# Patient Record
Sex: FEMALE | Race: WHITE | Employment: STUDENT | URBAN - METROPOLITAN AREA
[De-identification: names, ages, dates, MRNs, and addresses within clinical notes are randomized per-mention and may not be internally consistent; named-entity substitution may affect disease eponyms.]

---
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
## 2023-03-15 DIAGNOSIS — F32.A ANXIETY AND DEPRESSION: ICD-10-CM

## 2023-03-15 DIAGNOSIS — F41.9 ANXIETY AND DEPRESSION: ICD-10-CM

## 2023-03-19 RX ORDER — SERTRALINE HYDROCHLORIDE 100 MG/1
150 TABLET, FILM COATED ORAL EVERY MORNING
Qty: 20 TABLET | Refills: 0 | Status: SHIPPED | OUTPATIENT
Start: 2023-03-19

## 2023-03-21 ENCOUNTER — PATIENT MESSAGE (OUTPATIENT)
Dept: FAMILY MEDICINE CLINIC | Facility: CLINIC | Age: 18
End: 2023-03-21

## 2023-03-22 NOTE — TELEPHONE ENCOUNTER
Last tele med: 11/14/23  Last OV: 7/26/22    Printed and put in office for review (also requesting a signed letter)  LE ok for requests?

## 2023-03-22 NOTE — TELEPHONE ENCOUNTER
From: [facility]  To: Glenn Epley, DO  Sent: 3/21/2023 4:24 PM CDT  Subject: Chata Coleman,    I was wondering if you would be able to fill out some paperwork for a housing request for my living situation at school next year. Because of my blood test coming back for celiac, I have been gluten free for a year. My school does not have very many gluten free options and there are some on campus apartments that have a kitchen. This form would ensure that I have gluten free options for next year. Thank you!   [facility]

## 2023-05-12 ENCOUNTER — OFFICE VISIT (OUTPATIENT)
Dept: FAMILY MEDICINE CLINIC | Facility: CLINIC | Age: 18
End: 2023-05-12
Payer: COMMERCIAL

## 2023-05-12 VITALS
DIASTOLIC BLOOD PRESSURE: 68 MMHG | HEIGHT: 64 IN | WEIGHT: 143 LBS | SYSTOLIC BLOOD PRESSURE: 98 MMHG | HEART RATE: 98 BPM | OXYGEN SATURATION: 98 % | BODY MASS INDEX: 24.41 KG/M2 | RESPIRATION RATE: 16 BRPM

## 2023-05-12 DIAGNOSIS — F41.9 ANXIETY AND DEPRESSION: ICD-10-CM

## 2023-05-12 DIAGNOSIS — H66.90 EAR INFECTION: Primary | ICD-10-CM

## 2023-05-12 DIAGNOSIS — F90.9 ATTENTION DEFICIT HYPERACTIVITY DISORDER (ADHD), UNSPECIFIED ADHD TYPE: ICD-10-CM

## 2023-05-12 DIAGNOSIS — R09.81 NASAL CONGESTION: ICD-10-CM

## 2023-05-12 DIAGNOSIS — F32.A ANXIETY AND DEPRESSION: ICD-10-CM

## 2023-05-12 DIAGNOSIS — J02.9 SORE THROAT: ICD-10-CM

## 2023-05-12 LAB
CONTROL LINE PRESENT WITH A CLEAR BACKGROUND (YES/NO): YES YES/NO
CONTROL LINE PRESENT WITH A CLEAR BACKGROUND (YES/NO): YES YES/NO
KIT LOT #: 5064 NUMERIC
MONONUCLEOSIS TEST, QUAL: NEGATIVE
STREP GRP A CUL-SCR: NEGATIVE

## 2023-05-12 PROCEDURE — 3074F SYST BP LT 130 MM HG: CPT | Performed by: FAMILY MEDICINE

## 2023-05-12 PROCEDURE — 87081 CULTURE SCREEN ONLY: CPT | Performed by: FAMILY MEDICINE

## 2023-05-12 PROCEDURE — 3078F DIAST BP <80 MM HG: CPT | Performed by: FAMILY MEDICINE

## 2023-05-12 PROCEDURE — 3008F BODY MASS INDEX DOCD: CPT | Performed by: FAMILY MEDICINE

## 2023-05-12 PROCEDURE — 86308 HETEROPHILE ANTIBODY SCREEN: CPT | Performed by: FAMILY MEDICINE

## 2023-05-12 PROCEDURE — 99214 OFFICE O/P EST MOD 30 MIN: CPT | Performed by: FAMILY MEDICINE

## 2023-05-12 PROCEDURE — 87880 STREP A ASSAY W/OPTIC: CPT | Performed by: FAMILY MEDICINE

## 2023-05-12 RX ORDER — SERTRALINE HYDROCHLORIDE 100 MG/1
150 TABLET, FILM COATED ORAL EVERY MORNING
Qty: 135 TABLET | Refills: 0 | Status: SHIPPED | OUTPATIENT
Start: 2023-05-12

## 2023-05-12 RX ORDER — AZELASTINE HYDROCHLORIDE 137 UG/1
2 SPRAY, METERED NASAL NIGHTLY
Qty: 3 EACH | Refills: 0 | Status: SHIPPED | OUTPATIENT
Start: 2023-05-12

## 2023-05-12 RX ORDER — NORGESTIMATE AND ETHINYL ESTRADIOL 7DAYSX3 28
1 KIT ORAL DAILY
Qty: 84 TABLET | Refills: 0 | Status: SHIPPED | OUTPATIENT
Start: 2023-05-12

## 2023-05-12 RX ORDER — TRIAMCINOLONE ACETONIDE 55 UG/1
2 SPRAY, METERED NASAL NIGHTLY
Qty: 3 EACH | Refills: 0 | Status: SHIPPED | OUTPATIENT
Start: 2023-05-12

## 2023-05-12 RX ORDER — METHYLPHENIDATE HYDROCHLORIDE 18 MG/1
18 TABLET ORAL DAILY
Qty: 30 TABLET | Refills: 0 | Status: SHIPPED | OUTPATIENT
Start: 2023-07-13 | End: 2023-08-12

## 2023-05-12 RX ORDER — METHYLPHENIDATE HYDROCHLORIDE 18 MG/1
18 TABLET ORAL DAILY
Qty: 30 TABLET | Refills: 0 | Status: SHIPPED | OUTPATIENT
Start: 2023-06-12 | End: 2023-07-12

## 2023-05-12 RX ORDER — AMOXICILLIN AND CLAVULANATE POTASSIUM 875; 125 MG/1; MG/1
1 TABLET, FILM COATED ORAL 2 TIMES DAILY
Qty: 20 TABLET | Refills: 0 | Status: SHIPPED | OUTPATIENT
Start: 2023-05-12 | End: 2023-05-22

## 2023-05-12 RX ORDER — METHYLPHENIDATE HYDROCHLORIDE 18 MG/1
18 TABLET ORAL DAILY
Qty: 30 TABLET | Refills: 0 | Status: SHIPPED | OUTPATIENT
Start: 2023-05-12 | End: 2023-06-11

## 2023-08-06 ENCOUNTER — PATIENT MESSAGE (OUTPATIENT)
Dept: FAMILY MEDICINE CLINIC | Facility: CLINIC | Age: 18
End: 2023-08-06

## 2023-08-06 DIAGNOSIS — Z30.09 BIRTH CONTROL COUNSELING: Primary | ICD-10-CM

## 2023-08-07 DIAGNOSIS — F41.9 ANXIETY AND DEPRESSION: ICD-10-CM

## 2023-08-07 DIAGNOSIS — F32.A ANXIETY AND DEPRESSION: ICD-10-CM

## 2023-08-07 NOTE — TELEPHONE ENCOUNTER
From: Marlena Giang  To: Ene Gresham DO  Sent: 8/6/2023 6:51 PM CDT  Subject: Birth Control     Hello,    I hope you are having a great day. I was thinking about making the switch from the pill to a nexplanon implant. What should my next steps be? Thank you!

## 2023-08-08 RX ORDER — SERTRALINE HYDROCHLORIDE 100 MG/1
150 TABLET, FILM COATED ORAL EVERY MORNING
Qty: 45 TABLET | Refills: 0 | Status: SHIPPED | OUTPATIENT
Start: 2023-08-08

## 2023-10-30 DIAGNOSIS — F32.A ANXIETY AND DEPRESSION: ICD-10-CM

## 2023-10-30 DIAGNOSIS — F41.9 ANXIETY AND DEPRESSION: ICD-10-CM

## 2023-10-30 NOTE — TELEPHONE ENCOUNTER
.A refill request was received for:  Requested Prescriptions     Pending Prescriptions Disp Refills    SERTRALINE 100 MG Oral Tab [Pharmacy Med Name: SERTRALINE 100MG TABLETS] 45 tablet 0     Sig: TAKE 1 AND 1/2 TABLETS(150 MG) BY MOUTH EVERY MORNING       Last refill date:   8/8/2023    Last office visit: 5/12/2023    Follow up due:  No future appointments.

## 2023-11-01 RX ORDER — SERTRALINE HYDROCHLORIDE 100 MG/1
150 TABLET, FILM COATED ORAL EVERY MORNING
Qty: 15 TABLET | Refills: 0 | Status: SHIPPED | OUTPATIENT
Start: 2023-11-01

## 2023-11-17 DIAGNOSIS — R09.81 NASAL CONGESTION: ICD-10-CM

## 2023-11-17 RX ORDER — AZELASTINE HYDROCHLORIDE 137 UG/1
2 SPRAY, METERED NASAL NIGHTLY
Qty: 3 EACH | Refills: 0 | Status: SHIPPED | OUTPATIENT
Start: 2023-11-17

## 2023-12-01 ENCOUNTER — PATIENT MESSAGE (OUTPATIENT)
Dept: FAMILY MEDICINE CLINIC | Facility: CLINIC | Age: 18
End: 2023-12-01

## 2023-12-01 NOTE — TELEPHONE ENCOUNTER
From: Marlena Giang  To: Ene Gresham  Sent: 12/1/2023 12:21 PM CST  Subject: Obgyn referral    Hello! Also was wondering if you had a referral for an OB/GYN?      Thank you,  Marlena Giang
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
## 2024-01-08 ENCOUNTER — TELEMEDICINE (OUTPATIENT)
Dept: FAMILY MEDICINE CLINIC | Facility: CLINIC | Age: 19
End: 2024-01-08
Payer: COMMERCIAL

## 2024-01-08 DIAGNOSIS — F41.9 ANXIETY AND DEPRESSION: ICD-10-CM

## 2024-01-08 DIAGNOSIS — F90.9 ATTENTION DEFICIT HYPERACTIVITY DISORDER (ADHD), UNSPECIFIED ADHD TYPE: ICD-10-CM

## 2024-01-08 DIAGNOSIS — F32.A ANXIETY AND DEPRESSION: ICD-10-CM

## 2024-01-08 DIAGNOSIS — F43.10 PTSD (POST-TRAUMATIC STRESS DISORDER): ICD-10-CM

## 2024-01-08 RX ORDER — SERTRALINE HYDROCHLORIDE 100 MG/1
200 TABLET, FILM COATED ORAL EVERY MORNING
Qty: 60 TABLET | Refills: 0 | Status: SHIPPED | OUTPATIENT
Start: 2024-01-08

## 2024-01-08 RX ORDER — METHYLPHENIDATE HYDROCHLORIDE 18 MG/1
18 TABLET ORAL EVERY MORNING
Qty: 30 TABLET | Refills: 0 | Status: SHIPPED | OUTPATIENT
Start: 2024-01-08

## 2024-02-05 ENCOUNTER — PATIENT MESSAGE (OUTPATIENT)
Dept: FAMILY MEDICINE CLINIC | Facility: CLINIC | Age: 19
End: 2024-02-05

## 2024-02-05 DIAGNOSIS — F32.A ANXIETY AND DEPRESSION: ICD-10-CM

## 2024-02-05 DIAGNOSIS — F41.9 ANXIETY AND DEPRESSION: ICD-10-CM

## 2024-02-05 DIAGNOSIS — F43.10 PTSD (POST-TRAUMATIC STRESS DISORDER): ICD-10-CM

## 2024-02-05 DIAGNOSIS — F90.9 ATTENTION DEFICIT HYPERACTIVITY DISORDER (ADHD), UNSPECIFIED ADHD TYPE: ICD-10-CM

## 2024-02-05 RX ORDER — METHYLPHENIDATE HYDROCHLORIDE 18 MG/1
18 TABLET ORAL EVERY MORNING
Qty: 30 TABLET | Refills: 0 | Status: SHIPPED | OUTPATIENT
Start: 2024-02-05

## 2024-02-05 RX ORDER — SERTRALINE HYDROCHLORIDE 100 MG/1
200 TABLET, FILM COATED ORAL EVERY MORNING
Qty: 60 TABLET | Refills: 0 | Status: SHIPPED | OUTPATIENT
Start: 2024-02-05

## 2024-02-05 NOTE — TELEPHONE ENCOUNTER
From: Libra Brar  To: Opal Turner  Sent: 2/5/2024 2:26 PM CST  Subject: Sending prescriptions    Hello!    I was wondering if you would be able to send my sertraline and concerta prescriptions to the [facility] in [place]? Let me know if there’s anything on my end that needs to be completed.    Thank you,  Libra Brar

## 2024-03-07 DIAGNOSIS — F41.9 ANXIETY AND DEPRESSION: ICD-10-CM

## 2024-03-07 DIAGNOSIS — F32.A ANXIETY AND DEPRESSION: ICD-10-CM

## 2024-03-07 DIAGNOSIS — F43.10 PTSD (POST-TRAUMATIC STRESS DISORDER): ICD-10-CM

## 2024-03-07 RX ORDER — SERTRALINE HYDROCHLORIDE 100 MG/1
200 TABLET, FILM COATED ORAL EVERY MORNING
Qty: 60 TABLET | Refills: 0 | Status: SHIPPED | OUTPATIENT
Start: 2024-03-07

## 2024-03-07 NOTE — TELEPHONE ENCOUNTER
A refill request was received for:  Requested Prescriptions     Pending Prescriptions Disp Refills    SERTRALINE 100 MG Oral Tab [Pharmacy Med Name: SERTRALINE  MG TABLET] 60 tablet 0     Sig: TAKE 2 TABLETS BY MOUTH EVERY MORNING.       Last refill date:   2-5-24    Last office visit: 1-8-24    Follow up due:  No future appointments.

## 2024-03-14 DIAGNOSIS — F43.10 PTSD (POST-TRAUMATIC STRESS DISORDER): ICD-10-CM

## 2024-03-14 DIAGNOSIS — F32.A ANXIETY AND DEPRESSION: ICD-10-CM

## 2024-03-14 DIAGNOSIS — F41.9 ANXIETY AND DEPRESSION: ICD-10-CM

## 2024-03-14 RX ORDER — SERTRALINE HYDROCHLORIDE 100 MG/1
200 TABLET, FILM COATED ORAL EVERY MORNING
Qty: 60 TABLET | Refills: 0 | OUTPATIENT
Start: 2024-03-14

## 2024-03-14 NOTE — TELEPHONE ENCOUNTER
.A refill request was received for:  Requested Prescriptions     Pending Prescriptions Disp Refills    SERTRALINE 100 MG Oral Tab [Pharmacy Med Name: SERTRALINE 100MG TABLETS] 60 tablet 0     Sig: TAKE 2 TABLETS(200 MG) BY MOUTH EVERY MORNING       Last refill date:       Last office visit: 1/8/2024    Follow up due:  No future appointments.

## 2024-03-15 RX ORDER — SERTRALINE HYDROCHLORIDE 100 MG/1
200 TABLET, FILM COATED ORAL EVERY MORNING
Qty: 60 TABLET | Refills: 0 | Status: SHIPPED | OUTPATIENT
Start: 2024-03-15

## 2024-03-15 NOTE — TELEPHONE ENCOUNTER
A refill request was received for:  Requested Prescriptions     Pending Prescriptions Disp Refills    sertraline 100 MG Oral Tab 60 tablet 0     Sig: Take 2 tablets (200 mg total) by mouth every morning.       Last refill date:3/7/2024       Last office visit:1/8/2024     Follow up due:  No future appointments.

## 2024-04-14 DIAGNOSIS — F41.9 ANXIETY AND DEPRESSION: ICD-10-CM

## 2024-04-14 DIAGNOSIS — F32.A ANXIETY AND DEPRESSION: ICD-10-CM

## 2024-04-14 DIAGNOSIS — F43.10 PTSD (POST-TRAUMATIC STRESS DISORDER): ICD-10-CM

## 2024-04-15 RX ORDER — SERTRALINE HYDROCHLORIDE 100 MG/1
200 TABLET, FILM COATED ORAL EVERY MORNING
Qty: 60 TABLET | Refills: 0 | Status: SHIPPED | OUTPATIENT
Start: 2024-04-15

## 2024-04-15 NOTE — TELEPHONE ENCOUNTER
.A refill request was received for:  Requested Prescriptions     Pending Prescriptions Disp Refills    SERTRALINE 100 MG Oral Tab [Pharmacy Med Name: SERTRALINE  MG TABLET] 60 tablet 0     Sig: TAKE 2 TABLETS BY MOUTH EVERY MORNING.       Last refill date:   3/15/2023    Last office visit: 12/5/2023    Follow up due:  No future appointments.  . She shp    . Please call Pt to schedule a f/u. She should be seen within the next moth

## 2024-05-13 ENCOUNTER — OFFICE VISIT (OUTPATIENT)
Dept: FAMILY MEDICINE CLINIC | Facility: CLINIC | Age: 19
End: 2024-05-13

## 2024-05-13 VITALS
HEART RATE: 87 BPM | HEIGHT: 64 IN | SYSTOLIC BLOOD PRESSURE: 112 MMHG | RESPIRATION RATE: 18 BRPM | BODY MASS INDEX: 24 KG/M2 | DIASTOLIC BLOOD PRESSURE: 62 MMHG | OXYGEN SATURATION: 96 %

## 2024-05-13 DIAGNOSIS — B96.89 ACUTE BACTERIAL SINUSITIS: ICD-10-CM

## 2024-05-13 DIAGNOSIS — F43.10 PTSD (POST-TRAUMATIC STRESS DISORDER): ICD-10-CM

## 2024-05-13 DIAGNOSIS — Z00.00 ROUTINE GENERAL MEDICAL EXAMINATION AT A HEALTH CARE FACILITY: Primary | ICD-10-CM

## 2024-05-13 DIAGNOSIS — Z11.3 SCREENING EXAMINATION FOR STD (SEXUALLY TRANSMITTED DISEASE): ICD-10-CM

## 2024-05-13 DIAGNOSIS — J01.90 ACUTE BACTERIAL SINUSITIS: ICD-10-CM

## 2024-05-13 DIAGNOSIS — L70.8 OTHER ACNE: ICD-10-CM

## 2024-05-13 DIAGNOSIS — J31.0 NONALLERGIC RHINITIS: ICD-10-CM

## 2024-05-13 PROCEDURE — 87591 N.GONORRHOEAE DNA AMP PROB: CPT | Performed by: INTERNAL MEDICINE

## 2024-05-13 PROCEDURE — 99213 OFFICE O/P EST LOW 20 MIN: CPT | Performed by: INTERNAL MEDICINE

## 2024-05-13 PROCEDURE — 87491 CHLMYD TRACH DNA AMP PROBE: CPT | Performed by: INTERNAL MEDICINE

## 2024-05-13 PROCEDURE — 99395 PREV VISIT EST AGE 18-39: CPT | Performed by: INTERNAL MEDICINE

## 2024-05-13 RX ORDER — SULFAMETHOXAZOLE AND TRIMETHOPRIM 800; 160 MG/1; MG/1
1 TABLET ORAL 2 TIMES DAILY
Qty: 20 TABLET | Refills: 0 | Status: SHIPPED | OUTPATIENT
Start: 2024-05-13 | End: 2024-05-23

## 2024-05-13 RX ORDER — SERTRALINE HYDROCHLORIDE 100 MG/1
TABLET, FILM COATED ORAL
Qty: 180 TABLET | Refills: 1 | Status: SHIPPED | OUTPATIENT
Start: 2024-05-13

## 2024-05-13 RX ORDER — FEXOFENADINE HCL AND PSEUDOEPHEDRINE HCI 60; 120 MG/1; MG/1
1 TABLET, EXTENDED RELEASE ORAL DAILY PRN
Qty: 30 TABLET | Refills: 1 | Status: SHIPPED | OUTPATIENT
Start: 2024-05-13
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
## 2024-05-14 LAB
C TRACH DNA SPEC QL NAA+PROBE: NEGATIVE
N GONORRHOEA DNA SPEC QL NAA+PROBE: NEGATIVE

## 2024-08-26 DIAGNOSIS — J31.0 NONALLERGIC RHINITIS: ICD-10-CM

## 2024-08-26 DIAGNOSIS — F90.9 ATTENTION DEFICIT HYPERACTIVITY DISORDER (ADHD), UNSPECIFIED ADHD TYPE: ICD-10-CM

## 2024-08-27 RX ORDER — SERTRALINE HYDROCHLORIDE 100 MG/1
TABLET, FILM COATED ORAL
Qty: 180 TABLET | Refills: 1 | Status: SHIPPED | OUTPATIENT
Start: 2024-08-27

## 2024-08-27 RX ORDER — METHYLPHENIDATE HYDROCHLORIDE 18 MG/1
18 TABLET ORAL EVERY MORNING
Qty: 30 TABLET | Refills: 0 | OUTPATIENT
Start: 2024-08-27

## 2024-08-27 RX ORDER — FEXOFENADINE HCL AND PSEUDOEPHEDRINE HCI 60; 120 MG/1; MG/1
1 TABLET, EXTENDED RELEASE ORAL DAILY PRN
Qty: 30 TABLET | Refills: 1 | Status: SHIPPED | OUTPATIENT
Start: 2024-08-27

## 2024-08-27 NOTE — TELEPHONE ENCOUNTER
A refill request was received for:  Requested Prescriptions     Pending Prescriptions Disp Refills    sertraline 100 MG Oral Tab 180 tablet 1     Si tabs po daily    fexofenadine-pseudoephedrine ER (ALLEGRA-D ALLERGY & CONGESTION)  MG Oral Tablet 12 Hr 30 tablet 1     Sig: Take 1 tablet by mouth daily as needed.       Last refill date:2024       Last office visit:2024     Follow up due:  No future appointments.

## 2024-08-27 NOTE — TELEPHONE ENCOUNTER
A refill request was received for:  Requested Prescriptions     Pending Prescriptions Disp Refills    methylphenidate ER (CONCERTA) 18 MG Oral Tab CR 30 tablet 0     Sig: Take 1 tablet (18 mg total) by mouth every morning.       Last refill date:2/5/2024      Last office visit:5/13/2024     Follow up due:  No future appointments.

## 2024-09-26 DIAGNOSIS — F90.9 ATTENTION DEFICIT HYPERACTIVITY DISORDER (ADHD), UNSPECIFIED ADHD TYPE: ICD-10-CM

## 2024-09-27 RX ORDER — METHYLPHENIDATE HYDROCHLORIDE 18 MG/1
18 TABLET ORAL EVERY MORNING
Qty: 30 TABLET | Refills: 0 | OUTPATIENT
Start: 2024-09-27

## 2024-09-27 NOTE — TELEPHONE ENCOUNTER
.A refill request was received for:  Requested Prescriptions     Pending Prescriptions Disp Refills    methylphenidate ER (CONCERTA) 18 MG Oral Tab CR 30 tablet 0     Sig: Take 1 tablet (18 mg total) by mouth every morning.       Last refill date:   2/5/2024    Last office visit: 5/13/24    Follow up due:  No future appointments.

## 2024-10-02 DIAGNOSIS — F90.9 ATTENTION DEFICIT HYPERACTIVITY DISORDER (ADHD), UNSPECIFIED ADHD TYPE: ICD-10-CM

## 2024-10-03 RX ORDER — METHYLPHENIDATE HYDROCHLORIDE 18 MG/1
18 TABLET ORAL EVERY MORNING
Qty: 30 TABLET | Refills: 0 | OUTPATIENT
Start: 2024-10-03

## 2024-10-24 PROBLEM — J30.2 SEASONAL ALLERGIES: Status: ACTIVE | Noted: 2024-10-24

## 2024-10-24 PROBLEM — L70.0 ACNE VULGARIS: Status: ACTIVE | Noted: 2024-10-24

## 2024-10-24 PROBLEM — F41.9 ANXIETY: Status: ACTIVE | Noted: 2024-10-24

## 2025-01-15 ENCOUNTER — PATIENT MESSAGE (OUTPATIENT)
Dept: FAMILY MEDICINE CLINIC | Facility: CLINIC | Age: 20
End: 2025-01-15

## 2025-01-15 DIAGNOSIS — F41.9 ANXIETY: ICD-10-CM

## 2025-01-15 DIAGNOSIS — F90.0 ATTENTION DEFICIT HYPERACTIVITY DISORDER (ADHD), PREDOMINANTLY INATTENTIVE TYPE: ICD-10-CM

## 2025-01-16 RX ORDER — ARIPIPRAZOLE 2 MG/1
2 TABLET ORAL DAILY
Qty: 30 TABLET | Refills: 3 | Status: SHIPPED | OUTPATIENT
Start: 2025-01-16

## 2025-01-16 RX ORDER — METHYLPHENIDATE HYDROCHLORIDE 18 MG/1
18 TABLET ORAL DAILY
Qty: 30 TABLET | Refills: 0 | Status: SHIPPED | OUTPATIENT
Start: 2025-01-16 | End: 2025-02-15

## 2025-01-21 DIAGNOSIS — F32.A ANXIETY AND DEPRESSION: ICD-10-CM

## 2025-01-21 DIAGNOSIS — F41.9 ANXIETY AND DEPRESSION: ICD-10-CM

## 2025-01-21 DIAGNOSIS — J31.0 NONALLERGIC RHINITIS: ICD-10-CM

## 2025-01-21 RX ORDER — SERTRALINE HYDROCHLORIDE 100 MG/1
200 TABLET, FILM COATED ORAL EVERY MORNING
Qty: 180 TABLET | Refills: 0 | Status: SHIPPED | OUTPATIENT
Start: 2025-01-21 | End: 2025-01-23

## 2025-01-21 RX ORDER — SEGESTERONE ACETATE AND ETHINYL ESTRADIOL 103; 17.4 MG/1; MG/1
1 RING VAGINAL
Qty: 1 EACH | Refills: 0 | OUTPATIENT
Start: 2025-01-21

## 2025-01-21 RX ORDER — FEXOFENADINE HCL AND PSEUDOEPHEDRINE HCI 60; 120 MG/1; MG/1
1 TABLET, EXTENDED RELEASE ORAL DAILY PRN
Qty: 30 TABLET | Refills: 1 | Status: SHIPPED | OUTPATIENT
Start: 2025-01-21 | End: 2025-01-23

## 2025-01-21 NOTE — TELEPHONE ENCOUNTER
A refill request was received for:  Requested Prescriptions     Pending Prescriptions Disp Refills    fexofenadine-pseudoephedrine ER (ALLEGRA-D ALLERGY & CONGESTION)  MG Oral Tablet 12 Hr 30 tablet 1     Sig: Take 1 tablet by mouth daily as needed.    sertraline 100 MG Oral Tab 180 tablet 0     Sig: Take 2 tablets (200 mg total) by mouth every morning.       Last refill date: Sertraline 10/24/2024  Fexofenadine 8/27/2024      Last office visit: 11/7/2024 telemed    Follow up due:  No future appointments.

## 2025-01-21 NOTE — TELEPHONE ENCOUNTER
A refill request was received for:  Requested Prescriptions     Pending Prescriptions Disp Refills    ANNOVERA 0.013-0.15 MG/24HR Vaginal Ring 1 each 0     Sig: Place 1 Ring vaginally every 30 (thirty) days.       Last refill date:   6/18/24    Last office visit: 11/7/24    Follow up due:  No future appointments.

## 2025-02-18 DIAGNOSIS — F90.9 ATTENTION DEFICIT HYPERACTIVITY DISORDER (ADHD), UNSPECIFIED ADHD TYPE: ICD-10-CM

## 2025-02-18 DIAGNOSIS — F41.9 ANXIETY: ICD-10-CM

## 2025-02-18 NOTE — TELEPHONE ENCOUNTER
A refill request was received for:  Requested Prescriptions     Pending Prescriptions Disp Refills    ARIPiprazole (ABILIFY) 2 MG Oral Tab 30 tablet 3     Sig: Take 1 tablet (2 mg total) by mouth daily.       Last refill date:   1-16-25    Last office visit: 11-7-24 Tele visit    Follow up due:  Future Appointments   Date Time Provider Department Center   3/11/2025  3:30 PM Morenita Lynn NP [facility]   3/20/2025 10:30 AM Morenita Lynn NP [facility]

## 2025-02-18 NOTE — TELEPHONE ENCOUNTER
A refill request was received for:  Requested Prescriptions     Pending Prescriptions Disp Refills    methylphenidate ER (CONCERTA) 18 MG Oral Tab CR 30 tablet 0     Sig: Take 1 tablet (18 mg total) by mouth every morning.       Last refill date:1/16/2025       Last office visit: 10/24/2024 telemed    Follow up due:  Future Appointments   Date Time Provider Department Center   3/11/2025  3:30 PM Morenita Lynn NP [facility]   3/20/2025 10:30 AM Morenita Lynn NP [facility]

## 2025-02-20 RX ORDER — ARIPIPRAZOLE 2 MG/1
2 TABLET ORAL DAILY
Qty: 30 TABLET | Refills: 0 | Status: SHIPPED | OUTPATIENT
Start: 2025-02-20

## 2025-02-20 RX ORDER — METHYLPHENIDATE HYDROCHLORIDE 18 MG/1
18 TABLET ORAL EVERY MORNING
Qty: 30 TABLET | Refills: 0 | Status: SHIPPED | OUTPATIENT
Start: 2025-02-20

## 2025-03-20 ENCOUNTER — HOSPITAL ENCOUNTER (OUTPATIENT)
Dept: GENERAL RADIOLOGY | Age: 20
Discharge: HOME | End: 2025-03-20
Attending: INTERNAL MEDICINE
Payer: COMMERCIAL

## 2025-03-20 ENCOUNTER — EKG ENCOUNTER (OUTPATIENT)
Dept: LAB | Age: 20
End: 2025-03-20
Attending: INTERNAL MEDICINE
Payer: COMMERCIAL

## 2025-03-20 ENCOUNTER — OFFICE VISIT (OUTPATIENT)
Dept: FAMILY MEDICINE CLINIC | Facility: CLINIC | Age: 20
End: 2025-03-20
Payer: COMMERCIAL

## 2025-03-20 VITALS
HEIGHT: 64 IN | RESPIRATION RATE: 16 BRPM | HEART RATE: 96 BPM | OXYGEN SATURATION: 98 % | SYSTOLIC BLOOD PRESSURE: 110 MMHG | DIASTOLIC BLOOD PRESSURE: 78 MMHG | WEIGHT: 146 LBS | BODY MASS INDEX: 24.92 KG/M2

## 2025-03-20 DIAGNOSIS — R07.89 OTHER CHEST PAIN: ICD-10-CM

## 2025-03-20 DIAGNOSIS — J31.0 NONALLERGIC RHINITIS: ICD-10-CM

## 2025-03-20 DIAGNOSIS — F90.0 ATTENTION DEFICIT HYPERACTIVITY DISORDER (ADHD), PREDOMINANTLY INATTENTIVE TYPE: ICD-10-CM

## 2025-03-20 DIAGNOSIS — Z00.00 ROUTINE GENERAL MEDICAL EXAMINATION AT A HEALTH CARE FACILITY: ICD-10-CM

## 2025-03-20 DIAGNOSIS — Z00.00 ROUTINE GENERAL MEDICAL EXAMINATION AT A HEALTH CARE FACILITY: Primary | ICD-10-CM

## 2025-03-20 DIAGNOSIS — F41.9 ANXIETY: ICD-10-CM

## 2025-03-20 PROBLEM — R41.840 CONCENTRATION DEFICIT: Status: ACTIVE | Noted: 2025-03-20

## 2025-03-20 LAB
ATRIAL RATE: 91 BPM
P AXIS: 53 DEGREES
P-R INTERVAL: 152 MS
Q-T INTERVAL: 360 MS
QRS DURATION: 76 MS
QTC CALCULATION (BEZET): 442 MS
R AXIS: 74 DEGREES
T AXIS: 25 DEGREES
VENTRICULAR RATE: 91 BPM

## 2025-03-20 PROCEDURE — 99214 OFFICE O/P EST MOD 30 MIN: CPT | Performed by: INTERNAL MEDICINE

## 2025-03-20 PROCEDURE — 99395 PREV VISIT EST AGE 18-39: CPT | Performed by: INTERNAL MEDICINE

## 2025-03-20 PROCEDURE — 93005 ELECTROCARDIOGRAM TRACING: CPT

## 2025-03-20 PROCEDURE — 93010 ELECTROCARDIOGRAM REPORT: CPT | Performed by: INTERNAL MEDICINE

## 2025-03-20 PROCEDURE — 71046 X-RAY EXAM CHEST 2 VIEWS: CPT | Performed by: INTERNAL MEDICINE

## 2025-03-20 RX ORDER — FEXOFENADINE HCL AND PSEUDOEPHEDRINE HCI 60; 120 MG/1; MG/1
1 TABLET, EXTENDED RELEASE ORAL DAILY PRN
Qty: 30 TABLET | Refills: 1 | Status: SHIPPED | OUTPATIENT
Start: 2025-03-20

## 2025-03-20 RX ORDER — METHYLPHENIDATE HYDROCHLORIDE 27 MG/1
27 TABLET ORAL DAILY
Qty: 30 TABLET | Refills: 0 | Status: SHIPPED | OUTPATIENT
Start: 2025-05-21 | End: 2025-06-20

## 2025-03-20 RX ORDER — METHYLPHENIDATE HYDROCHLORIDE 27 MG/1
27 TABLET ORAL DAILY
Qty: 30 TABLET | Refills: 0 | Status: SHIPPED | OUTPATIENT
Start: 2025-04-20 | End: 2025-05-20

## 2025-03-20 RX ORDER — ARIPIPRAZOLE 2 MG/1
TABLET ORAL
Qty: 90 TABLET | Refills: 0 | Status: SHIPPED | OUTPATIENT
Start: 2025-03-20

## 2025-03-20 RX ORDER — SERTRALINE HYDROCHLORIDE 100 MG/1
200 TABLET, FILM COATED ORAL EVERY MORNING
Qty: 180 TABLET | Refills: 1 | Status: SHIPPED | OUTPATIENT
Start: 2025-03-20

## 2025-03-20 RX ORDER — METHYLPHENIDATE HYDROCHLORIDE 27 MG/1
27 TABLET ORAL DAILY
Qty: 30 TABLET | Refills: 0 | Status: SHIPPED | OUTPATIENT
Start: 2025-03-20 | End: 2025-04-19

## 2025-03-20 NOTE — PROGRESS NOTES
HPI:   Libra Brar is a 19 year old female who presents for a well woman exam. Symptoms: denies discharge, itching, burning or dysuria.      Patient's last menstrual period was 03/02/2025 (approximate).  Previous pap: n/a  Performs SBEs:sometimes  Contraception: ring    Follow up ADHD. Doing well on Concerta but feeling like its not as effective anymore.     Follow up allergies. Allergy tested in the past- showed non allergic rhinitis.      Follow up PTSD/anxiety. On sertraline and abilify. When she drinks at school, the abilify makes her sick in the morning. Throws up the morning after drinking since starting this medicine.    New c/o chest pain noticed randomly, happens more at night. Laying on her side at night, left side of chest, pinching/tight feeling. 20 minutes later same spot for 10 seconds. No asthma (mom and brother with asthma). No URIs. + allergic rhinitis that is not well controlled despite doing what the allergist suggested.                         Current Outpatient Medications   Medication Sig Dispense Refill    ARIPiprazole (ABILIFY) 2 MG Oral Tab Take 1/2 tab po daily. Ok to skip 1 day a week if needed. 90 tablet 0    fexofenadine-pseudoephedrine ER (ALLEGRA-D ALLERGY & CONGESTION)  MG Oral Tablet 12 Hr Take 1 tablet by mouth daily as needed. 30 tablet 1    sertraline 100 MG Oral Tab Take 2 tablets (200 mg total) by mouth every morning. 180 tablet 1    methylphenidate ER (CONCERTA) 27 MG Oral Tab CR Take 1 tablet (27 mg total) by mouth daily. 30 tablet 0    [START ON 4/20/2025] methylphenidate ER (CONCERTA) 27 MG Oral Tab CR Take 1 tablet (27 mg total) by mouth daily. 30 tablet 0    [START ON 5/21/2025] methylphenidate ER (CONCERTA) 27 MG Oral Tab CR Take 1 tablet (27 mg total) by mouth daily. 30 tablet 0    ONEXTON 1.2-3.75 % External Gel Apply 1 Application topically nightly.      ANNOVERA 0.013-0.15 MG/24HR Vaginal Ring Place 1 Ring vaginally every 30 (thirty) days.      spironolactone  25 MG Oral Tab Take 3 tablets (75 mg total) by mouth every evening.        Past Medical History:    Abdominal pain    ADD (attention deficit disorder)    Anxiety    Bad breath    Belching    Bloating    Constipation    Diarrhea, unspecified    Fatigue    Food intolerance    History of depression    History of mental disorder    Nausea      Past Surgical History:   Procedure Laterality Date    Tonsillectomy        Family History   Problem Relation Age of Onset    Hypertension Mother     Heart Attack Maternal Grandmother     Stroke Maternal Grandmother     Heart Attack Maternal Grandfather     Heart Attack Paternal Grandfather     Ulcerative Colitis Maternal Uncle     Bleeding Disorders Maternal Uncle       Social History:   Social History     Socioeconomic History    Marital status: Single   Tobacco Use    Smoking status: Never    Smokeless tobacco: Never   Substance and Sexual Activity    Alcohol use: Never     Alcohol/week: 0.0 standard drinks of alcohol    Drug use: Never     Exercise:  yes .  Diet:  balanced     REVIEW OF SYSTEMS:   GENERAL: feels well otherwise; no fevers or chills  SKIN: no rash  HEENT:no vision or hearing changes; chronic nasal congestion    LUNGS: denies shortness of breath, chest heaviness or cough; no pain with deep inspiration  CV: denies palpitations or near syncope; + chest pinching as above left side, does not radiate, lasts 10-40 seconds, only at night   GI: denies abdominal pain; denies heartburn, frequent diarrhea or constipation  : denies dysuria, vaginal discharge or itching; denies pelvic pain;not sexually active  MS: denies back pain  NEURO: denies headaches; no dizziness  PSYCH: denies panic; no SI or HI; hx of PTSD and anxiety  HEME: denies hx of anemia  ENDOCRINE: denies thyroid history, denies excessive thirst, denies significant weight change  ALL/ASTHMA: + allergy     EXAM:   /78   Pulse 96   Resp 16   Ht 5' 4\" (1.626 m)   Wt 146 lb (66.2 kg)   LMP 03/02/2025  (Approximate)   SpO2 98%   BMI 25.06 kg/m²       Body mass index is 25.06 kg/m².      GENERAL: pleasant and in no acute distress  SKIN: warm and dry  HEENT: atraumatic, normocephalic; PERRLA, conjunctiva clear; TMs clear   NECK: supple, no thyromegaly  LUNGS: clear to auscultation, easy breathing, no wheezing, no cough  CV: normal S1S2, RRR without murmur  GI: BSs present, no tenderness or organomegaly  :no pelvic TTP  MS: Mark, no bony deformities  EXT: no cyanosis, clubbing or edema  NEURO: A&Ox3, motor and sensory are grossly intact, good coordination; no tremors       ASSESSMENT AND PLAN:    1. Routine general medical examination at a health care facility  Reinforced routine SBEs. Discussed the benefits of routine exercise, a heart healthy diet and annual flu vaccines. Defer pap at this time.  - EKG 12 Lead; Future  - CBC With Differential With Platelet; Future  - Comp Metabolic Panel (14); Future  - Lipid Panel; Future  - TSH and Free T4; Future  - Vitamin D; Future  - Vitamin B12; Future    2. Other chest pain  - EKG 12 Lead; Future  - XR CHEST PA + LAT CHEST (CPT=71046); Future    3. Anxiety  - reduce ability to 1 mg daily (1/2 tab)  - ARIPiprazole (ABILIFY) 2 MG Oral Tab; Take 1/2 tab po daily. Ok to skip 1 day a week if needed.  Dispense: 90 tablet; Refill: 0  - sertraline 100 MG Oral Tab; Take 2 tablets (200 mg total) by mouth every morning.  Dispense: 180 tablet; Refill: 1    4. Attention deficit hyperactivity disorder (ADHD), predominantly inattentive type  - EKG 12 Lead; Future  - methylphenidate ER (CONCERTA) 27 MG Oral Tab CR; Take 1 tablet (27 mg total) by mouth daily.  Dispense: 30 tablet; Refill: 0  - methylphenidate ER (CONCERTA) 27 MG Oral Tab CR; Take 1 tablet (27 mg total) by mouth daily.  Dispense: 30 tablet; Refill: 0  - methylphenidate ER (CONCERTA) 27 MG Oral Tab CR; Take 1 tablet (27 mg total) by mouth daily.  Dispense: 30 tablet; Refill: 0    5. Nonallergic rhinitis  -  fexofenadine-pseudoephedrine ER (ALLEGRA-D ALLERGY & CONGESTION)  MG Oral Tablet 12 Hr; Take 1 tablet by mouth daily as needed.  Dispense: 30 tablet; Refill: 1      Libra was given an opportunity to ask questions and verbalized understanding of care.               .

## 2025-03-21 ENCOUNTER — LAB ENCOUNTER (OUTPATIENT)
Dept: LAB | Age: 20
End: 2025-03-21
Attending: INTERNAL MEDICINE
Payer: COMMERCIAL

## 2025-03-21 DIAGNOSIS — Z00.00 ROUTINE GENERAL MEDICAL EXAMINATION AT A HEALTH CARE FACILITY: ICD-10-CM

## 2025-03-21 LAB
ALBUMIN SERPL-MCNC: 4.5 G/DL (ref 3.2–4.8)
ALBUMIN/GLOB SERPL: 1.5 {RATIO} (ref 1–2)
ALP LIVER SERPL-CCNC: 73 U/L
ALT SERPL-CCNC: 12 U/L
ANION GAP SERPL CALC-SCNC: 10 MMOL/L (ref 0–18)
AST SERPL-CCNC: 21 U/L (ref ?–34)
BASOPHILS # BLD AUTO: 0.05 X10(3) UL (ref 0–0.2)
BASOPHILS NFR BLD AUTO: 0.8 %
BILIRUB SERPL-MCNC: 0.3 MG/DL (ref 0.3–1.2)
BUN BLD-MCNC: 12 MG/DL (ref 9–23)
CALCIUM BLD-MCNC: 9.8 MG/DL (ref 8.7–10.6)
CHLORIDE SERPL-SCNC: 105 MMOL/L (ref 98–112)
CHOLEST SERPL-MCNC: 173 MG/DL (ref ?–200)
CO2 SERPL-SCNC: 24 MMOL/L (ref 21–32)
CREAT BLD-MCNC: 0.74 MG/DL
EGFRCR SERPLBLD CKD-EPI 2021: 118 ML/MIN/1.73M2 (ref 60–?)
EOSINOPHIL # BLD AUTO: 0.25 X10(3) UL (ref 0–0.7)
EOSINOPHIL NFR BLD AUTO: 3.8 %
ERYTHROCYTE [DISTWIDTH] IN BLOOD BY AUTOMATED COUNT: 15.2 %
FASTING PATIENT LIPID ANSWER: YES
FASTING STATUS PATIENT QL REPORTED: YES
GLOBULIN PLAS-MCNC: 3 G/DL (ref 2–3.5)
GLUCOSE BLD-MCNC: 86 MG/DL (ref 70–99)
HCT VFR BLD AUTO: 41.2 %
HDLC SERPL-MCNC: 91 MG/DL (ref 40–59)
HGB BLD-MCNC: 13.4 G/DL
IMM GRANULOCYTES # BLD AUTO: 0.02 X10(3) UL (ref 0–1)
IMM GRANULOCYTES NFR BLD: 0.3 %
LDLC SERPL CALC-MCNC: 65 MG/DL (ref ?–100)
LYMPHOCYTES # BLD AUTO: 2.2 X10(3) UL (ref 1–4)
LYMPHOCYTES NFR BLD AUTO: 33.7 %
MCH RBC QN AUTO: 26.9 PG (ref 26–34)
MCHC RBC AUTO-ENTMCNC: 32.5 G/DL (ref 31–37)
MCV RBC AUTO: 82.7 FL
MONOCYTES # BLD AUTO: 0.54 X10(3) UL (ref 0.1–1)
MONOCYTES NFR BLD AUTO: 8.3 %
NEUTROPHILS # BLD AUTO: 3.46 X10 (3) UL (ref 1.5–7.7)
NEUTROPHILS # BLD AUTO: 3.46 X10(3) UL (ref 1.5–7.7)
NEUTROPHILS NFR BLD AUTO: 53.1 %
NONHDLC SERPL-MCNC: 82 MG/DL (ref ?–130)
OSMOLALITY SERPL CALC.SUM OF ELEC: 287 MOSM/KG (ref 275–295)
PLATELET # BLD AUTO: 188 10(3)UL (ref 150–450)
POTASSIUM SERPL-SCNC: 3.9 MMOL/L (ref 3.5–5.1)
PROT SERPL-MCNC: 7.5 G/DL (ref 5.7–8.2)
RBC # BLD AUTO: 4.98 X10(6)UL
SODIUM SERPL-SCNC: 139 MMOL/L (ref 136–145)
T4 FREE SERPL-MCNC: 1.1 NG/DL (ref 0.8–1.7)
TRIGL SERPL-MCNC: 96 MG/DL (ref 30–149)
TSI SER-ACNC: 4.19 UIU/ML (ref 0.55–4.78)
VIT B12 SERPL-MCNC: 562 PG/ML (ref 211–911)
VIT D+METAB SERPL-MCNC: 26.7 NG/ML (ref 30–100)
VLDLC SERPL CALC-MCNC: 14 MG/DL (ref 0–30)
WBC # BLD AUTO: 6.5 X10(3) UL (ref 4–11)

## 2025-03-21 PROCEDURE — 80061 LIPID PANEL: CPT

## 2025-03-21 PROCEDURE — 84439 ASSAY OF FREE THYROXINE: CPT

## 2025-03-21 PROCEDURE — 82607 VITAMIN B-12: CPT

## 2025-03-21 PROCEDURE — 80053 COMPREHEN METABOLIC PANEL: CPT

## 2025-03-21 PROCEDURE — 85025 COMPLETE CBC W/AUTO DIFF WBC: CPT

## 2025-03-21 PROCEDURE — 82306 VITAMIN D 25 HYDROXY: CPT

## 2025-03-21 PROCEDURE — 84443 ASSAY THYROID STIM HORMONE: CPT

## 2025-03-21 PROCEDURE — 36415 COLL VENOUS BLD VENIPUNCTURE: CPT

## 2025-04-16 DIAGNOSIS — F41.9 ANXIETY: ICD-10-CM

## 2025-04-16 DIAGNOSIS — J31.0 NONALLERGIC RHINITIS: ICD-10-CM

## 2025-04-16 DIAGNOSIS — F90.0 ATTENTION DEFICIT HYPERACTIVITY DISORDER (ADHD), PREDOMINANTLY INATTENTIVE TYPE: ICD-10-CM

## 2025-04-17 RX ORDER — METHYLPHENIDATE HYDROCHLORIDE 27 MG/1
27 TABLET ORAL DAILY
Qty: 30 TABLET | Refills: 0 | OUTPATIENT
Start: 2025-04-17 | End: 2025-05-17

## 2025-04-17 RX ORDER — SERTRALINE HYDROCHLORIDE 100 MG/1
200 TABLET, FILM COATED ORAL EVERY MORNING
Qty: 180 TABLET | Refills: 1 | OUTPATIENT
Start: 2025-04-17

## 2025-04-17 RX ORDER — FEXOFENADINE HCL AND PSEUDOEPHEDRINE HCI 60; 120 MG/1; MG/1
1 TABLET, EXTENDED RELEASE ORAL DAILY PRN
Qty: 30 TABLET | Refills: 1 | OUTPATIENT
Start: 2025-04-17

## 2025-04-17 RX ORDER — ARIPIPRAZOLE 2 MG/1
TABLET ORAL
Qty: 90 TABLET | Refills: 0 | OUTPATIENT
Start: 2025-04-17

## 2025-04-17 NOTE — TELEPHONE ENCOUNTER
A refill request was received for:  Requested Prescriptions     Pending Prescriptions Disp Refills    ARIPiprazole (ABILIFY) 2 MG Oral Tab 90 tablet 0     Sig: Take 1/2 tab po daily. Ok to skip 1 day a week if needed.    fexofenadine-pseudoephedrine ER (ALLEGRA-D ALLERGY & CONGESTION)  MG Oral Tablet 12 Hr 30 tablet 1     Sig: Take 1 tablet by mouth daily as needed.    sertraline 100 MG Oral Tab 180 tablet 1     Sig: Take 2 tablets (200 mg total) by mouth every morning.    methylphenidate ER (CONCERTA) 27 MG Oral Tab CR 30 tablet 0     Sig: Take 1 tablet (27 mg total) by mouth daily.       Last refill date: 03/20/25      Last office visit: 03/20/25      No future appointments.

## 2025-06-17 ENCOUNTER — OFFICE VISIT (OUTPATIENT)
Dept: FAMILY MEDICINE CLINIC | Facility: CLINIC | Age: 20
End: 2025-06-17
Payer: COMMERCIAL

## 2025-06-17 VITALS
TEMPERATURE: 99 F | BODY MASS INDEX: 24.8 KG/M2 | WEIGHT: 140 LBS | HEIGHT: 63 IN | DIASTOLIC BLOOD PRESSURE: 70 MMHG | RESPIRATION RATE: 18 BRPM | OXYGEN SATURATION: 98 % | HEART RATE: 98 BPM | SYSTOLIC BLOOD PRESSURE: 100 MMHG

## 2025-06-17 DIAGNOSIS — R39.9 UTI SYMPTOMS: Primary | ICD-10-CM

## 2025-06-17 DIAGNOSIS — Z01.89 PATIENT REQUESTED DIAGNOSTIC TESTING: ICD-10-CM

## 2025-06-17 LAB
APPEARANCE: CLEAR
BILIRUBIN: NEGATIVE
CONTROL LINE PRESENT WITH A CLEAR BACKGROUND (YES/NO): YES YES/NO
GLUCOSE (URINE DIPSTICK): NEGATIVE MG/DL
KETONES (URINE DIPSTICK): NEGATIVE MG/DL
KIT LOT #: NORMAL NUMERIC
LEUKOCYTES: NEGATIVE
MULTISTIX LOT#: NORMAL NUMERIC
NITRITE, URINE: NEGATIVE
OCCULT BLOOD: NEGATIVE
PH, URINE: 6 (ref 4.5–8)
PREGNANCY TEST, URINE: NEGATIVE
PROTEIN (URINE DIPSTICK): NEGATIVE MG/DL
SPECIFIC GRAVITY: 1.02 (ref 1–1.03)
URINE-COLOR: YELLOW
UROBILINOGEN,SEMI-QN: 0.2 MG/DL (ref 0–1.9)

## 2025-06-17 PROCEDURE — 87591 N.GONORRHOEAE DNA AMP PROB: CPT | Performed by: NURSE PRACTITIONER

## 2025-06-17 PROCEDURE — 99213 OFFICE O/P EST LOW 20 MIN: CPT | Performed by: NURSE PRACTITIONER

## 2025-06-17 PROCEDURE — 81003 URINALYSIS AUTO W/O SCOPE: CPT | Performed by: NURSE PRACTITIONER

## 2025-06-17 PROCEDURE — 87491 CHLMYD TRACH DNA AMP PROBE: CPT | Performed by: NURSE PRACTITIONER

## 2025-06-17 PROCEDURE — 81025 URINE PREGNANCY TEST: CPT | Performed by: NURSE PRACTITIONER

## 2025-06-17 NOTE — PATIENT INSTRUCTIONS
Urinalysis results discussed in clinic.  No indication on POCT UA to start on antibiotic or send culture.  Encouraged to increase oral intake.  Proper wiping technique after urination.  Wear cotton underwear.  Wash with mild soap and water.  Tylenol/Ibuprofen for pain/fever.

## 2025-06-17 NOTE — PROGRESS NOTES
CHIEF COMPLAINT:     Chief Complaint   Patient presents with    Urinary Symptoms     Symptoms for two weeks :  Cloudy urine and odor in urine.  No concerns for STD's        HPI:   Libra Brar is a 19 year old female who presents with c/o cloudy urine and strong odor. Denies dysuria, urinary frequency, urinary urgency, fever, vaginal fishy vaginal odor, vaginal discharge, vaginal itching, suprapubic pain, flank pain, CVA tenderness, h/o renal transplant or kidney stone. Reports lower abdominal cramping but is going to start menses soon.     Current Medications[1]   Past Medical History[2]   Social History:  Short Social Hx on File[3]      REVIEW OF SYSTEMS:   GENERAL: Denies fever, chills, or body aches  SKIN: no rashes, no skin wounds or ulcers.  GI: See HPI. No N/V/C/D.   : See HPI.  NEURO: no headaches.    EXAM:   /70   Pulse 98   Temp 98.9 °F (37.2 °C)   Resp 18   Ht 5' 3\" (1.6 m)   Wt 140 lb (63.5 kg)   LMP 03/02/2025 (Approximate)   SpO2 98%   BMI 24.80 kg/m²   GENERAL: well developed, well nourished,in no apparent distress  CARDIO: RRR, no murmurs  LUNGS: clear to ausculation bilaterally, no wheezing or rhonchi  GI: BS present x 4.  No hepatosplenomegaly.  : denies suprapubic tenderness. No bladder distention, or CVAT     Recent Results (from the past 24 hours)   URINALYSIS, AUTO, W/O SCOPE    Collection Time: 06/17/25 11:51 AM   Result Value Ref Range    Glucose Urine Negative Negative mg/dL    Bilirubin Urine Negative Negative    Ketones, UA Negative Negative - Trace mg/dL    Spec Gravity 1.020 1.005 - 1.030    Blood Urine Negative Negative    PH Urine 6.0 5.0 - 8.0    Protein Urine Negative Negative - Trace mg/dL    Urobilinogen Urine 0.2 0.2 - 1.0 mg/dL    Nitrite Urine Negative Negative    Leukocyte Esterase Urine Negative Negative    APPEARANCE clear Clear    Color Urine yellow Yellow    Multistix Lot# [id number] Numeric    Multistix Expiration Date 03/31/2026 Date         ASSESSMENT  AND PLAN:   Libra Brar is a 19 year old female presents with UTI symptoms.    ASSESSMENT:  Encounter Diagnoses   Name Primary?    UTI symptoms Yes    Patient requested diagnostic testing        PLAN: Meds as listed below.  Comfort measures as described in Patient Instructions  Chlamydia and GC testing pending.     Meds & Refills for this Visit:  Requested Prescriptions      No prescriptions requested or ordered in this encounter       Risk and benefits of medication discussed. Stressed importance of completing full course of antibiotic unless told otherwise.     Patient Instructions   Urinalysis results discussed in clinic.  No indication on POCT UA to start on antibiotic or send culture.  Encouraged to increase oral intake.  Proper wiping technique after urination.  Wear cotton underwear.  Wash with mild soap and water.  Tylenol/Ibuprofen for pain/fever.         The patient indicates understanding of these issues and agrees to the plan.  The patient is asked to return in 3 days if not better. Call if fever, vomiting, worsening symptoms.         [1]   Current Outpatient Medications   Medication Sig Dispense Refill    ARIPiprazole (ABILIFY) 2 MG Oral Tab Take 1/2 tab po daily. Ok to skip 1 day a week if needed. 90 tablet 0    fexofenadine-pseudoephedrine ER (ALLEGRA-D ALLERGY & CONGESTION)  MG Oral Tablet 12 Hr Take 1 tablet by mouth daily as needed. 30 tablet 1    sertraline 100 MG Oral Tab Take 2 tablets (200 mg total) by mouth every morning. 180 tablet 1    methylphenidate ER (CONCERTA) 27 MG Oral Tab CR Take 1 tablet (27 mg total) by mouth daily. 30 tablet 0    ONEXTON 1.2-3.75 % External Gel Apply 1 Application topically nightly.      ANNOVERA 0.013-0.15 MG/24HR Vaginal Ring Place 1 Ring vaginally every 30 (thirty) days.      spironolactone 25 MG Oral Tab Take 3 tablets (75 mg total) by mouth every evening.     [2]   Past Medical History:   Abdominal pain    ADD (attention deficit disorder)    Anxiety     Bad breath    Belching    Bloating    Constipation    Diarrhea, unspecified    Fatigue    Food intolerance    History of depression    History of mental disorder    Nausea   [3]   Social History  Socioeconomic History    Marital status: Single   Tobacco Use    Smoking status: Never    Smokeless tobacco: Never   Substance and Sexual Activity    Alcohol use: Never     Alcohol/week: 0.0 standard drinks of alcohol    Drug use: Never

## 2025-06-18 LAB
C TRACH DNA SPEC QL NAA+PROBE: NEGATIVE
N GONORRHOEA DNA SPEC QL NAA+PROBE: NEGATIVE

## 2025-07-30 DIAGNOSIS — F41.9 ANXIETY: ICD-10-CM

## 2025-07-30 RX ORDER — SERTRALINE HYDROCHLORIDE 100 MG/1
200 TABLET, FILM COATED ORAL EVERY MORNING
Qty: 180 TABLET | Refills: 1 | Status: SHIPPED | OUTPATIENT
Start: 2025-07-30

## 2025-08-08 DIAGNOSIS — F90.0 ATTENTION DEFICIT HYPERACTIVITY DISORDER (ADHD), PREDOMINANTLY INATTENTIVE TYPE: ICD-10-CM

## 2025-08-08 RX ORDER — METHYLPHENIDATE HYDROCHLORIDE 27 MG/1
27 TABLET ORAL DAILY
Qty: 30 TABLET | Refills: 0 | Status: SHIPPED | OUTPATIENT
Start: 2025-08-08

## 2025-08-26 ENCOUNTER — PATIENT MESSAGE (OUTPATIENT)
Dept: FAMILY MEDICINE CLINIC | Facility: CLINIC | Age: 20
End: 2025-08-26

## (undated) NOTE — MR AVS SNAPSHOT
[facility]  [address] [facility]  [place] [id number] [phone]               Thank you for choosing us for your health care visit with BELKIS Thompson. We are glad to serve you and happy to provide you with this summary of your visit.   Martin you feel better. This is very important to ensure the infection is treated. It is also important to prevent drug-resistent organisms from developing. If you were given an antibiotic shot, no more antibiotics are needed.   · You may use acetaminophen (Tyleno *Growth percentiles are based on [facility] 2-20 Years data         Current Medications          This list is accurate as of: 4/9/17  4:10 PM.  Always use your most recent med list.                ADDERALL XR 10 MG Cp24   Generic drug:  Amphetamine-Dextroamphet often. Sometimes toddlers need to try a food 10 times before they actually accept and enjoy it. It is also important to encourage play time as soon as they start crawling and walking.  As your children grow, continue to help them live a healthy active lifes

## (undated) NOTE — LETTER
04/03/23    To whom it may concern:     My patient Kathleen Ambriz, YOB: 2005, has a detected Celiac gene on her lab results. Please consider this request for my patient to have more gluten free options in the cafeteria on campus as this is what she needs to prevent health complications. Please reach out with any questions.      Thank you,   Dr. Fidel Ulrich DO

## (undated) NOTE — ED AVS SNAPSHOT
[facility] Emergency Department    [place]  [address] [address]    Phone:  [phone]    Fax:  Inga   MRN: [id number]    Department:  [facility] Emergency Department   Date of Visit:  5/5/201 IF THERE IS ANY CHANGE OR WORSENING OF YOUR CONDITION, CALL YOUR PRIMARY CARE PHYSICIAN AT ONCE OR RETURN IMMEDIATELY TO THE EMERGENCY DEPARTMENT.     If you have been prescribed any medication(s), please fill your prescription right away and begin taking t

## (undated) NOTE — ED AVS SNAPSHOT
[facility] Emergency Department    [place]  [address] [address]    Phone:  [phone]    Fax:  Inga   MRN: [id number]    Department:  [facility] Emergency Department   Date of Visit:  5/5/201 Pediatric [phone] Emergency Department   [phone]       To Check ER Wait Times:  TEXT 'ERwait' to 47958      Click [email or web address]      Or call [phone]    If you have any problems with your follow-up, please call our case [facility] before you leave. After you leave, you should follow the attached instructions. I have read and understand the instructions given to me by my caregivers. 24-Hour Pharmacies        Pharmacy Address Phone Number   [facility] [address]. RIVS access allows you to view health information for your child from their recent   visit, view other health information and more. To sign up or find more information on getting   Proxy Access to your child’s [facility] go to [email or web address]